# Patient Record
Sex: FEMALE | Race: WHITE
[De-identification: names, ages, dates, MRNs, and addresses within clinical notes are randomized per-mention and may not be internally consistent; named-entity substitution may affect disease eponyms.]

---

## 2017-04-07 ENCOUNTER — HOSPITAL ENCOUNTER (OUTPATIENT)
Dept: HOSPITAL 79 - RAD | Age: 42
End: 2017-04-07
Attending: CLINIC/CENTER
Payer: COMMERCIAL

## 2017-04-07 DIAGNOSIS — S90.122S: Primary | ICD-10-CM

## 2022-10-12 ENCOUNTER — HOSPITAL ENCOUNTER (OUTPATIENT)
Facility: HOSPITAL | Age: 47
Setting detail: SURGERY ADMIT
End: 2022-10-12
Attending: PLASTIC SURGERY | Admitting: PLASTIC SURGERY

## 2022-11-10 ENCOUNTER — PRE-ADMISSION TESTING (OUTPATIENT)
Dept: PREADMISSION TESTING | Facility: HOSPITAL | Age: 47
End: 2022-11-10

## 2022-11-10 ENCOUNTER — APPOINTMENT (OUTPATIENT)
Dept: PREADMISSION TESTING | Facility: HOSPITAL | Age: 47
End: 2022-11-10

## 2022-11-10 VITALS — WEIGHT: 174.6 LBS | BODY MASS INDEX: 35.2 KG/M2 | HEIGHT: 59 IN

## 2022-11-10 LAB
ANION GAP SERPL CALCULATED.3IONS-SCNC: 10 MMOL/L (ref 5–15)
APTT PPP: 30.8 SECONDS (ref 22–39)
BUN SERPL-MCNC: 29 MG/DL (ref 6–20)
BUN/CREAT SERPL: 24.8 (ref 7–25)
CALCIUM SPEC-SCNC: 9.7 MG/DL (ref 8.6–10.5)
CHLORIDE SERPL-SCNC: 103 MMOL/L (ref 98–107)
CO2 SERPL-SCNC: 28 MMOL/L (ref 22–29)
CREAT SERPL-MCNC: 1.17 MG/DL (ref 0.57–1)
DEPRECATED RDW RBC AUTO: 42.9 FL (ref 37–54)
EGFRCR SERPLBLD CKD-EPI 2021: 58 ML/MIN/1.73
ERYTHROCYTE [DISTWIDTH] IN BLOOD BY AUTOMATED COUNT: 11.6 % (ref 12.3–15.4)
GLUCOSE SERPL-MCNC: 139 MG/DL (ref 65–99)
HBA1C MFR BLD: 7.6 % (ref 4.8–5.6)
HCT VFR BLD AUTO: 39.4 % (ref 34–46.6)
HGB BLD-MCNC: 13.3 G/DL (ref 12–15.9)
INR PPP: 0.97 (ref 0.84–1.13)
MCH RBC QN AUTO: 33.8 PG (ref 26.6–33)
MCHC RBC AUTO-ENTMCNC: 33.8 G/DL (ref 31.5–35.7)
MCV RBC AUTO: 100.3 FL (ref 79–97)
PLATELET # BLD AUTO: 262 10*3/MM3 (ref 140–450)
PMV BLD AUTO: 10.7 FL (ref 6–12)
POTASSIUM SERPL-SCNC: 4 MMOL/L (ref 3.5–5.2)
PROTHROMBIN TIME: 12.7 SECONDS (ref 11.4–14.4)
QT INTERVAL: 404 MS
QTC INTERVAL: 410 MS
RBC # BLD AUTO: 3.93 10*6/MM3 (ref 3.77–5.28)
SODIUM SERPL-SCNC: 141 MMOL/L (ref 136–145)
WBC NRBC COR # BLD: 7.61 10*3/MM3 (ref 3.4–10.8)

## 2022-11-10 PROCEDURE — 85730 THROMBOPLASTIN TIME PARTIAL: CPT

## 2022-11-10 PROCEDURE — 85027 COMPLETE CBC AUTOMATED: CPT

## 2022-11-10 PROCEDURE — 93010 ELECTROCARDIOGRAM REPORT: CPT | Performed by: INTERNAL MEDICINE

## 2022-11-10 PROCEDURE — 83036 HEMOGLOBIN GLYCOSYLATED A1C: CPT

## 2022-11-10 PROCEDURE — 80048 BASIC METABOLIC PNL TOTAL CA: CPT

## 2022-11-10 PROCEDURE — 36415 COLL VENOUS BLD VENIPUNCTURE: CPT

## 2022-11-10 PROCEDURE — 85610 PROTHROMBIN TIME: CPT

## 2022-11-10 PROCEDURE — 93005 ELECTROCARDIOGRAM TRACING: CPT

## 2022-11-10 RX ORDER — TOPIRAMATE 100 MG/1
100 TABLET, FILM COATED ORAL 2 TIMES DAILY
COMMUNITY
Start: 2022-10-11

## 2022-11-10 RX ORDER — NYSTATIN 100000 U/G
1 CREAM TOPICAL AS NEEDED
COMMUNITY
Start: 2022-09-27

## 2022-11-10 RX ORDER — ENOXAPARIN SODIUM 100 MG/ML
INJECTION SUBCUTANEOUS
COMMUNITY
Start: 2022-11-03

## 2022-11-10 RX ORDER — LATANOPROST 50 UG/ML
SOLUTION/ DROPS OPHTHALMIC
COMMUNITY
Start: 2022-09-07

## 2022-11-10 RX ORDER — PROCHLORPERAZINE 25 MG/1
SUPPOSITORY RECTAL
COMMUNITY
Start: 2022-09-12

## 2022-11-10 RX ORDER — INSULIN ASPART 100 [IU]/ML
INJECTION, SOLUTION INTRAVENOUS; SUBCUTANEOUS
COMMUNITY
Start: 2022-08-23

## 2022-11-10 RX ORDER — SUMATRIPTAN 100 MG/1
TABLET, FILM COATED ORAL
COMMUNITY

## 2022-11-10 RX ORDER — GALCANEZUMAB 120 MG/ML
120 INJECTION, SOLUTION SUBCUTANEOUS
COMMUNITY
Start: 2022-10-12

## 2022-11-10 RX ORDER — HYDROCHLOROTHIAZIDE 25 MG/1
25 TABLET ORAL DAILY
COMMUNITY
Start: 2022-10-11

## 2022-11-10 RX ORDER — BACLOFEN 10 MG/1
10 TABLET ORAL DAILY
COMMUNITY
Start: 2022-10-11

## 2022-11-10 RX ORDER — LEVOTHYROXINE SODIUM 125 MCG
125 TABLET ORAL DAILY
COMMUNITY
Start: 2022-10-11

## 2022-11-10 RX ORDER — PROCHLORPERAZINE 25 MG/1
SUPPOSITORY RECTAL
COMMUNITY
Start: 2022-11-07

## 2022-11-10 RX ORDER — METFORMIN HYDROCHLORIDE 500 MG/1
1000 TABLET, EXTENDED RELEASE ORAL DAILY
COMMUNITY
Start: 2022-10-11

## 2022-11-10 RX ORDER — SERTRALINE HYDROCHLORIDE 100 MG/1
100 TABLET, FILM COATED ORAL DAILY
COMMUNITY
Start: 2022-11-03

## 2022-11-10 RX ORDER — LEVOCETIRIZINE DIHYDROCHLORIDE 5 MG/1
5 TABLET, FILM COATED ORAL NIGHTLY
COMMUNITY
Start: 2022-11-05

## 2022-11-10 RX ORDER — HYDROCODONE BITARTRATE AND ACETAMINOPHEN 7.5; 325 MG/1; MG/1
1-2 TABLET ORAL
COMMUNITY
Start: 2022-11-03

## 2022-11-10 RX ORDER — ONDANSETRON 4 MG/1
4 TABLET, FILM COATED ORAL EVERY 8 HOURS PRN
COMMUNITY
Start: 2022-11-03

## 2022-11-10 RX ORDER — OMEPRAZOLE 40 MG/1
40 CAPSULE, DELAYED RELEASE ORAL DAILY
COMMUNITY
Start: 2022-10-11

## 2022-11-10 RX ORDER — ERGOCALCIFEROL 1.25 MG/1
50000 CAPSULE ORAL
COMMUNITY
Start: 2022-10-14

## 2022-11-10 RX ORDER — DOXYCYCLINE HYCLATE 100 MG/1
TABLET, DELAYED RELEASE ORAL
COMMUNITY
End: 2022-11-10

## 2022-11-10 RX ORDER — DOCUSATE SODIUM 100 MG/1
CAPSULE, LIQUID FILLED ORAL
COMMUNITY
Start: 2022-11-03

## 2022-11-10 RX ORDER — EZETIMIBE 10 MG/1
10 TABLET ORAL DAILY
COMMUNITY
Start: 2022-10-11

## 2022-11-10 RX ORDER — MONTELUKAST SODIUM 10 MG/1
10 TABLET ORAL DAILY
COMMUNITY
Start: 2022-09-12

## 2022-11-10 RX ORDER — INSULIN PMP CART,AUT,G6/7,CNTR
EACH SUBCUTANEOUS
COMMUNITY
Start: 2022-10-28

## 2022-11-10 RX ORDER — LOSARTAN POTASSIUM 100 MG/1
100 TABLET ORAL DAILY
COMMUNITY

## 2022-11-10 RX ORDER — DIAZEPAM 5 MG/1
TABLET ORAL
COMMUNITY
Start: 2022-11-03

## 2022-11-10 RX ORDER — MELOXICAM 15 MG/1
15 TABLET ORAL DAILY
COMMUNITY
Start: 2022-09-14

## 2022-11-10 RX ORDER — CLINDAMYCIN HYDROCHLORIDE 300 MG/1
CAPSULE ORAL
COMMUNITY
Start: 2022-11-03

## 2022-11-10 RX ORDER — INSULIN PMP CART,AUT,G6/7,CNTR
EACH SUBCUTANEOUS
COMMUNITY
Start: 2022-09-17

## 2022-11-10 NOTE — PAT
An arrival time for procedure was not provided during PAT visit. If patient had any questions or concerns about their arrival time, they were instructed to contact their surgeon/physician.  Additionally, if the patient referred to an arrival time that was acquired from their my chart account, patient was encouraged to verify that time with their surgeon/physician. Arrival times are NOT provided in Pre Admission Testing Department.    Patient to apply Chlorhexadine wipes  to surgical area (as instructed) the night before procedure and the AM of procedure. Wipes provided.    Patient denies any current skin issues.     Patient viewed general PAT education video as instructed in their preoperative information received from their surgeon.  Patient stated the general PAT education video was viewed in its entirety and survey completed.  Copies of PAT general education handouts (Incentive Spirometry, Meds to Beds Program, Patient Belongings, Pre-op skin preparation instructions, Blood Glucose testing, Visitor policy, Surgery FAQ, Code H) distributed to patient if not printed. Education related to the PAT pass and skin preparation for surgery (if applicable) completed in PAT as a reinforcement to PAT education video. Patient instructed to return PAT pass provided today as well as completed skin preparation sheet (if applicable) on the day of procedure.     Additionally if patient had not viewed video yet but intended to view it at home or in our waiting area, then referred them to the handout with QR code/link provided during PAT visit.  Instructed patient to complete survey after viewing the video in its entirety.  Encouraged patient/family to read PAT general education handouts thoroughly and notify PAT staff with any questions or concerns. Patient verbalized understanding of all information and priority content.

## 2023-09-22 ENCOUNTER — TRANSCRIBE ORDERS (OUTPATIENT)
Dept: ADMINISTRATIVE | Facility: HOSPITAL | Age: 48
End: 2023-09-22
Payer: COMMERCIAL

## 2023-09-22 DIAGNOSIS — N28.9 RENAL INSUFFICIENCY: Primary | ICD-10-CM

## 2024-07-10 ENCOUNTER — TELEPHONE (OUTPATIENT)
Dept: PREADMISSION TESTING | Facility: HOSPITAL | Age: 49
End: 2024-07-10

## 2024-07-11 ENCOUNTER — PRE-ADMISSION TESTING (OUTPATIENT)
Dept: PREADMISSION TESTING | Facility: HOSPITAL | Age: 49
End: 2024-07-11
Payer: COMMERCIAL

## 2024-07-11 VITALS — WEIGHT: 179.2 LBS | BODY MASS INDEX: 37.61 KG/M2 | HEIGHT: 58 IN

## 2024-07-11 LAB
ANION GAP SERPL CALCULATED.3IONS-SCNC: 11.2 MMOL/L (ref 5–15)
BUN SERPL-MCNC: 27 MG/DL (ref 6–20)
BUN/CREAT SERPL: 18.4 (ref 7–25)
CALCIUM SPEC-SCNC: 8.8 MG/DL (ref 8.6–10.5)
CHLORIDE SERPL-SCNC: 105 MMOL/L (ref 98–107)
CO2 SERPL-SCNC: 25.8 MMOL/L (ref 22–29)
CREAT SERPL-MCNC: 1.47 MG/DL (ref 0.57–1)
DEPRECATED RDW RBC AUTO: 42.2 FL (ref 37–54)
EGFRCR SERPLBLD CKD-EPI 2021: 43.6 ML/MIN/1.73
ERYTHROCYTE [DISTWIDTH] IN BLOOD BY AUTOMATED COUNT: 12.1 % (ref 12.3–15.4)
GLUCOSE SERPL-MCNC: 140 MG/DL (ref 65–99)
HCT VFR BLD AUTO: 32.4 % (ref 34–46.6)
HGB BLD-MCNC: 11.2 G/DL (ref 12–15.9)
MCH RBC QN AUTO: 33.2 PG (ref 26.6–33)
MCHC RBC AUTO-ENTMCNC: 34.6 G/DL (ref 31.5–35.7)
MCV RBC AUTO: 96.1 FL (ref 79–97)
PLATELET # BLD AUTO: 238 10*3/MM3 (ref 140–450)
PMV BLD AUTO: 10.8 FL (ref 6–12)
POTASSIUM SERPL-SCNC: 3.5 MMOL/L (ref 3.5–5.2)
RBC # BLD AUTO: 3.37 10*6/MM3 (ref 3.77–5.28)
SODIUM SERPL-SCNC: 142 MMOL/L (ref 136–145)
WBC NRBC COR # BLD AUTO: 5.67 10*3/MM3 (ref 3.4–10.8)

## 2024-07-11 PROCEDURE — 36415 COLL VENOUS BLD VENIPUNCTURE: CPT

## 2024-07-11 PROCEDURE — 80048 BASIC METABOLIC PNL TOTAL CA: CPT

## 2024-07-11 PROCEDURE — 93005 ELECTROCARDIOGRAM TRACING: CPT

## 2024-07-11 PROCEDURE — 85027 COMPLETE CBC AUTOMATED: CPT

## 2024-07-11 RX ORDER — GABAPENTIN 100 MG/1
100 CAPSULE ORAL 2 TIMES DAILY
COMMUNITY

## 2024-07-11 RX ORDER — ESTRADIOL 1 MG/1
1 TABLET ORAL DAILY
COMMUNITY

## 2024-07-11 RX ORDER — MINOXIDIL 2.5 MG/1
2.5 TABLET ORAL DAILY
COMMUNITY

## 2024-07-11 RX ORDER — METOCLOPRAMIDE 10 MG/1
10 TABLET ORAL 4 TIMES DAILY PRN
COMMUNITY

## 2024-07-11 RX ORDER — TRAZODONE HYDROCHLORIDE 50 MG/1
50 TABLET ORAL NIGHTLY
COMMUNITY

## 2024-07-11 NOTE — DISCHARGE INSTRUCTIONS
Pre-Admission testing appointment completed today for patient's upcoming procedure here at [facility].    PAT PASS reviewed with patient and they verbalize understanding of the following:     Do not eat or drink anything after midnight the night before procedure unless otherwise instructed by physician/surgeon's office, this includes no gum, candy, mints, tobacco products or e-cigarettes.  Do not shave the area to be operated on at least 48 hours prior to procedure.  Do not wear makeup, lotion, hair products, or nail polish.  Do not wear any jewelry and remove all piercings.  Do not wear any adhesive if you wear dentures.  Do not wear contacts; bring in glasses if needed.  Only take medications on the morning of procedure as instructed by PAT nurse per anesthesia guidelines or as instructed by physician's office.  If you are on any blood thinners reach out to the physician/surgeon's office for instructions on when/if they will need to be stopped prior to procedure.  Bring in picture ID and insurance card, advanced directive copies if applicable, CPAP/BIPAP/Inhalers if indicated morning of procedure, leave any other valuables at home.  Ensure you have arranged for someone to drive you home the day of your procedure and someone to care for you at home afterwards. It is recommended that you do not drive, drink alcohol, or make any major legal decisions for at least 24 hours after your procedure is complete.  Chlorhexadine sponge along with instruction/information sheet given to patient. Readybath wipes given in lieu of CHG if patient has CHG allergy. Instructed patient to date, time, and initial the verification sheet once skin prep has been completed, and to return to Same Day [facility] the day of the procedure.     Introduction to anesthesia video watched by patient during appointment and anesthesia FAQ tip sheet given to patient.  Instructions and information given to patient about parking, hospital entrance, and  registration location.Chlorhexidine sponge along with instruction/verification sheet given to pt.  Instructed pt to date, time, and initial the verification sheet once skin prep has been  completed, and to return to Same Day Surgery the day of the procedure.  Pt. Verbalizes understanding.

## 2024-07-12 LAB
QT INTERVAL: 406 MS
QTC INTERVAL: 431 MS

## 2024-12-18 ENCOUNTER — OFFICE VISIT (OUTPATIENT)
Dept: CARDIOLOGY | Facility: CLINIC | Age: 49
End: 2024-12-18
Payer: COMMERCIAL

## 2024-12-18 ENCOUNTER — PATIENT ROUNDING (BHMG ONLY) (OUTPATIENT)
Dept: CARDIOLOGY | Facility: CLINIC | Age: 49
End: 2024-12-18
Payer: COMMERCIAL

## 2024-12-18 VITALS
WEIGHT: 171.4 LBS | HEART RATE: 58 BPM | SYSTOLIC BLOOD PRESSURE: 122 MMHG | BODY MASS INDEX: 34.56 KG/M2 | OXYGEN SATURATION: 94 % | DIASTOLIC BLOOD PRESSURE: 82 MMHG | HEIGHT: 59 IN

## 2024-12-18 DIAGNOSIS — E10.22 TYPE 1 DIABETES MELLITUS WITH STAGE 3 CHRONIC KIDNEY DISEASE, UNSPECIFIED WHETHER STAGE 3A OR 3B CKD: ICD-10-CM

## 2024-12-18 DIAGNOSIS — R94.31 ABNORMAL ECG: ICD-10-CM

## 2024-12-18 DIAGNOSIS — I34.1 MITRAL VALVE PROLAPSE: Primary | ICD-10-CM

## 2024-12-18 DIAGNOSIS — N18.30 TYPE 1 DIABETES MELLITUS WITH STAGE 3 CHRONIC KIDNEY DISEASE, UNSPECIFIED WHETHER STAGE 3A OR 3B CKD: ICD-10-CM

## 2024-12-18 DIAGNOSIS — I10 PRIMARY HYPERTENSION: ICD-10-CM

## 2024-12-18 NOTE — PROGRESS NOTES
[facility] CARDIOLOGY    New Patient Office Visit    Patient Name: Virginie Bean  : 1975   MRN: [id number]   Care Team: Patient Care Team:  Sherice Kenney APRN as PCP - General (Family Medicine)  Grayson Rodríguez MD as Consulting Physician (Geriatric Medicine)  Ruddy Bella MD as Cardiologist (Cardiology)    Chief Complaint   Patient presents with    Abnormal ECG     HPI: Virginie Bean is a 49 y.o. female with a history of DM I since ~, Hashimotos, CKD, HTN,  who presents today for an abnormal ECG done prior to knee arthroscopy.  She denies following with a cardiologist recently and does not have exertional chest pain or dyspnea.  She does report lower extremity swelling right greater than left which had been present prior to her knee procedure as well.  Distantly, she had been told that she had mitral valve prolapse based on echocardiogram.  This occurred while she was pregnant with her oldest child but never had any follow-up studies.  She has never had any ischemic evaluation.    Subjective   Review of Systems   Constitutional:  Negative for activity change.   Respiratory:  Negative for chest tightness and shortness of breath.    Cardiovascular:  Positive for leg swelling. Negative for chest pain and palpitations.       Past Medical History:   Diagnosis Date    Arthritis     CKD stage 3a, GFR 45-59 ml/min     Depression     Diabetes mellitus     TYPE 1    Disease of thyroid gland     GERD (gastroesophageal reflux disease)     Glaucoma     Hashimoto's disease     Hyperlipidemia     Hypertension     Migraines     Seasonal allergies     Tattoos     Vitamin D deficiency      Past Surgical History:   Procedure Laterality Date    BILATERAL BREAST REDUCTION      CHOLECYSTECTOMY      COLONOSCOPY      HIATAL HERNIA REPAIR      X2    HYSTERECTOMY      HYSTEROSCOPY ENDOMETRIAL ABLATION      KNEE ARTHROSCOPY Right     LAPAROSCOPIC TUBAL LIGATION       Social History      Socioeconomic History    Marital status:    Tobacco Use    Smoking status: Never    Smokeless tobacco: Never   Vaping Use    Vaping status: Never Used   Substance and Sexual Activity    Alcohol use: Not Currently    Drug use: Never    Sexual activity: Defer     History reviewed. No pertinent family history.      Current Outpatient Medications:     baclofen (LIORESAL) 10 MG tablet, Take 1 tablet by mouth Every Night., Disp: , Rfl:     Continuous Blood Gluc Sensor (Dexcom G6 Sensor), AS DIRECTED CHANGE SENSOR EVERY 10 DAYS 90 DAYS, Disp: , Rfl:     Continuous Blood Gluc Transmit (Dexcom G6 Transmitter) misc, , Disp: , Rfl:     Emgality 120 MG/ML auto-injector pen, Inject 1 mL under the skin into the appropriate area as directed Every 30 (Thirty) Days., Disp: , Rfl:     estradiol (ESTRACE) 1 MG tablet, Take 1 tablet by mouth Daily., Disp: , Rfl:     ezetimibe (ZETIA) 10 MG tablet, Take 1 tablet by mouth Daily., Disp: , Rfl:     Insulin Disposable Pump (Omnipod 5 G6 Intro, Gen 5,) kit, , Disp: , Rfl:     Insulin Disposable Pump (Omnipod 5 G6 Pod, Gen 5,) misc, CHANGE EVERY 3 DAYS, Disp: , Rfl:     latanoprost (XALATAN) 0.005 % ophthalmic solution, PLACE 1 DROP INTO BOTH EYES AT BEDTIME, Disp: , Rfl:     levocetirizine (XYZAL) 5 MG tablet, Take 1 tablet by mouth Every Night., Disp: , Rfl:     losartan (COZAAR) 100 MG tablet, Take 1 tablet by mouth Daily., Disp: , Rfl:     metoprolol tartrate (LOPRESSOR) 25 MG tablet, Take 1 tablet by mouth 2 (Two) Times a Day., Disp: , Rfl:     minoxidil (LONITEN) 2.5 MG tablet, Take 1 tablet by mouth Daily., Disp: , Rfl:     montelukast (SINGULAIR) 10 MG tablet, Take 1 tablet by mouth Daily., Disp: , Rfl:     NON FORMULARY, 1 (One) Time Per Week. ALLERGY INJECTIONS, Disp: , Rfl:     NovoLOG 100 UNIT/ML injection, INJECT UP  UNITS DAILY, VIA INSULIN PUMP SUBCUTANEOUS 90 DAYS, Disp: , Rfl:     omeprazole (priLOSEC) 40 MG capsule, Take 1 capsule by mouth Daily., Disp: ,  "Rfl:     sertraline (ZOLOFT) 100 MG tablet, Take 1 tablet by mouth Daily., Disp: , Rfl:     SUMAtriptan (IMITREX) 100 MG tablet, sumatriptan 100 mg tablet  TAKE 1 TABLET FOR MIGRAINE RELIEF. MAY REPEAT 2 HOURS LATER. MAXIMUM 200MG/DAY., Disp: , Rfl:     Synthroid 125 MCG tablet, Take 1 tablet by mouth Daily., Disp: , Rfl:     topiramate (TOPAMAX) 100 MG tablet, Take 1 tablet by mouth 2 (Two) Times a Day., Disp: , Rfl:     traZODone (DESYREL) 50 MG tablet, Take 1 tablet by mouth Every Night., Disp: , Rfl:     vitamin D (ERGOCALCIFEROL) 1.25 MG (56177 UT) capsule capsule, Take 1 capsule by mouth Every 7 (Seven) Days. MONDAY, Disp: , Rfl:   Allergies   Allergen Reactions    Turmeric Anaphylaxis    Guaifenesin Hives    Levofloxacin Hives    Other Hives     MONOCRYL SUTURE    Oxycontin [Oxycodone] GI Intolerance    Penicillins Hives    Sulfa Antibiotics Hives       Objective     Vitals:    12/18/24 0900   BP: 122/82   BP Location: Left arm   Patient Position: Sitting   Cuff Size: Adult   Pulse: 58   SpO2: 94%   Weight: 77.7 kg (171 lb 6.4 oz)   Height: 149.9 cm (59\")     Body mass index is 34.62 kg/m².  Gen: well developed, sitting up on exam table, comfortable appearing  HEENT: MMM, sclera anicteric, conjunctiva normal, no carotid bruits  CV: regular rate, regular rhythm, no murmurs or rubs, normal S1, S2. 2+ radial and PT pulses  Pulm: RA, normal work of breathing, no wheezes, rales, rhonchi  Ext: normal bulk for age, normal tone, trace dependent edema  Neuro: alert, oriented, face symmetrical, moving all extremities well  Psych: normal mood, appropriate affect    Most recent PCP note, imaging tests, and labs reviewed.    Labs:  Lab Results   Component Value Date    WBC 5.67 07/11/2024    HGB 11.2 (L) 07/11/2024    HCT 32.4 (L) 07/11/2024    MCV 96.1 07/11/2024     07/11/2024     Lab Results   Component Value Date    GLUCOSE 140 (H) 07/11/2024    BUN 27 (H) 07/11/2024    CREATININE 1.47 (H) 07/11/2024    BCR " 18.4 07/11/2024    K 3.5 07/11/2024    CO2 25.8 07/11/2024    CALCIUM 8.8 07/11/2024     Lab Results   Component Value Date    HGBA1C 7.60 (H) 11/10/2022     11/6/24   - Cr 1.4, BUN 32,    - TSH 2.1   - Ferritin 12, B12 422, Folate 6.7    10/24/24   - Total cholesterol 206, HDL 68, triglycerides 129,           ECG 12 Lead    Date/Time: 12/18/2024 9:21 AM  Performed by: Ruddy Bella MD    Authorized by: Ruddy Bella MD  Comparison: compared with previous ECG from 7/11/2024  Comparison to previous ECG: Non-specific ST/T wave changes have improved  Rhythm: sinus bradycardia  Rate: bradycardic  BPM: 58  Conduction: conduction normal  ST Segments: ST segments normal  T Waves: T waves normal  QRS axis: normal    Clinical impression: normal ECG        Assessment & Plan       ICD-10-CM ICD-9-CM   1. Mitral valve prolapse  I34.1 424.0   2. Abnormal ECG  R94.31 794.31   3. Primary hypertension  I10 401.9   4. Type 1 diabetes mellitus with stage 3 chronic kidney disease, unspecified whether stage 3a or 3b CKD  E10.22 250.41    N18.30 585.3        Abnormal ECG   - Previous tracing reviewed, nonspecific changes essentially resolved today and ECG is currently benign.   -In the absence of symptoms we will defer any coronary evaluation at this time    Possible history of MVP  Leg swelling   -Check TTE    Hypertension   -Continue current regimen, well-controlled today      Return in about 6 months (around 6/18/2025).    CARMELO Bella MD  12/18/24    [facility] Cardiology  [facility] [address] [phone]

## 2024-12-18 NOTE — PROGRESS NOTES
December 18, 2024    Hello, may I speak with Virginie Bean?    My name is GERI MEREDITH      I am  with [facility] CARDIOLOGY  [address]  [address]  [facility] [address] [phone].    Before we get started may I verify your date of birth? 1975    I am calling to officially welcome you to our practice and ask about your recent visit. Is this a good time to talk? yes    Tell me about your visit with us. What things went well?  EVERYTHING WENT REALLY GREAT.        We're always looking for ways to make our patients' experiences even better. Do you have recommendations on ways we may improve?  no    Overall were you satisfied with your first visit to our practice? yes       I appreciate you taking the time to speak with me today. Is there anything else I can do for you? no      Thank you, and have a great day.

## 2024-12-26 ENCOUNTER — HOSPITAL ENCOUNTER (OUTPATIENT)
Dept: CARDIOLOGY | Facility: HOSPITAL | Age: 49
Discharge: HOME | End: 2024-12-26
Admitting: INTERNAL MEDICINE
Payer: COMMERCIAL

## 2024-12-26 DIAGNOSIS — I34.1 MITRAL VALVE PROLAPSE: ICD-10-CM

## 2024-12-26 LAB
AV MEAN PRESS GRAD SYS DOP V1V2: 7 MMHG
AV VMAX SYS DOP: 184 CM/SEC
BH CV ECHO MEAS - AO MAX PG: 13.5 MMHG
BH CV ECHO MEAS - AO ROOT DIAM: 2 CM
BH CV ECHO MEAS - AO V2 VTI: 43.2 CM
BH CV ECHO MEAS - EDV(CUBED): 54.9 ML
BH CV ECHO MEAS - EDV(MOD-SP4): 35.6 ML
BH CV ECHO MEAS - EF(MOD-SP4): 71.3 %
BH CV ECHO MEAS - ESV(CUBED): 19.7 ML
BH CV ECHO MEAS - ESV(MOD-SP4): 10.2 ML
BH CV ECHO MEAS - FS: 28.9 %
BH CV ECHO MEAS - IVS/LVPW: 0.93 CM
BH CV ECHO MEAS - IVSD: 1.1 CM
BH CV ECHO MEAS - LA DIMENSION: 2.8 CM
BH CV ECHO MEAS - LAT PEAK E' VEL: 7.7 CM/SEC
BH CV ECHO MEAS - LV DIASTOLIC VOL/BSA (35-75): 20.6 CM2
BH CV ECHO MEAS - LV MASS(C)D: 183.4 GRAMS
BH CV ECHO MEAS - LV SYSTOLIC VOL/BSA (12-30): 5.9 CM2
BH CV ECHO MEAS - LVIDD: 3.8 CM
BH CV ECHO MEAS - LVIDS: 2.7 CM
BH CV ECHO MEAS - LVOT AREA: 1.54 CM2
BH CV ECHO MEAS - LVOT DIAM: 1.4 CM
BH CV ECHO MEAS - LVPWD: 1.2 CM
BH CV ECHO MEAS - MED PEAK E' VEL: 8.1 CM/SEC
BH CV ECHO MEAS - MV A MAX VEL: 73.8 CM/SEC
BH CV ECHO MEAS - MV E MAX VEL: 102 CM/SEC
BH CV ECHO MEAS - MV E/A: 1.38
BH CV ECHO MEAS - PA ACC TIME: 0.09 SEC
BH CV ECHO MEAS - RAP SYSTOLE: 10 MMHG
BH CV ECHO MEAS - RVSP: 36.3 MMHG
BH CV ECHO MEAS - SV(MOD-SP4): 25.4 ML
BH CV ECHO MEAS - SVI(MOD-SP4): 14.7 ML/M2
BH CV ECHO MEAS - TAPSE (>1.6): 2.09 CM
BH CV ECHO MEAS - TR MAX PG: 26.3 MMHG
BH CV ECHO MEAS - TR MAX VEL: 256.3 CM/SEC
BH CV ECHO MEASUREMENTS AVERAGE E/E' RATIO: 12.91
LEFT ATRIUM VOLUME INDEX: 19.1 ML/M2

## 2024-12-26 PROCEDURE — 93306 TTE W/DOPPLER COMPLETE: CPT

## 2025-04-14 ENCOUNTER — TELEPHONE (OUTPATIENT)
Dept: CARDIOLOGY | Facility: CLINIC | Age: 50
End: 2025-04-14
Payer: COMMERCIAL

## 2025-04-14 NOTE — TELEPHONE ENCOUNTER
Patient is requesting sooner appointment with Dr. Bella to manage BP. Patient states that she has been monitoring her BP since 3/28 when advised by her nephrologist. She reports she saw her PCP on 4/9, who adjusted her BP medications. BP has been 150s-160s/ 100s-110s. Advised patient to contact nephrology about elevated BP and medications. Patient verbalizes understanding.

## 2025-06-10 NOTE — PROGRESS NOTES
[facility] CARDIOLOGY    Established Patient Office Visit    Patient Name: Virginie Bean  : 1975   MRN: [id number]   Care Team: Patient Care Team:  Sherice Kenney APRN as PCP - General (Family Medicine)  Grayson Rodríguez MD as Consulting Physician (Geriatric Medicine)  Ruddy Bella MD as Cardiologist (Cardiology)    Chief Complaint   Patient presents with    Mitral Valve Prolapse     HPI: Virginie Bean is a 50 y.o. female with a history of DM I since ~, Hashimotos, CKD, HTN,  who presents today for routine follow-up. She had initially been referred due to an abnormal ECG done prior to knee arthroscopy but ECG at her Dec 2024 visit was normal.     Since her initial visit she reports no new cardiac issues.  She underwent her knee arthroscopy with no issues with improvement in her knee pain.  She has had intermittent leg swelling but it is currently doing well right now.  Her blood pressure has been better controlled than prior.    Subjective   Subjective   Review of Systems   Constitutional:  Negative for activity change.   Respiratory:  Negative for chest tightness and shortness of breath.    Cardiovascular:  Negative for chest pain, palpitations and leg swelling.       Current Outpatient Medications:     amLODIPine (NORVASC) 2.5 MG tablet, Take 1 tablet by mouth Daily., Disp: , Rfl:     amLODIPine (NORVASC) 5 MG tablet, Take 1 tablet by mouth Daily., Disp: , Rfl:     baclofen (LIORESAL) 10 MG tablet, Take 1 tablet by mouth Every Night., Disp: , Rfl:     cefdinir (OMNICEF) 300 MG capsule, Take 1 capsule by mouth Daily., Disp: , Rfl:     Continuous Blood Gluc Sensor (Dexcom G6 Sensor), AS DIRECTED CHANGE SENSOR EVERY 10 DAYS 90 DAYS, Disp: , Rfl:     Continuous Blood Gluc Transmit (Dexcom G6 Transmitter) misc, , Disp: , Rfl:     Emgality 120 MG/ML auto-injector pen, Inject 1 mL under the skin into the appropriate area as directed Every 30 (Thirty) Days., Disp:  , Rfl:     ezetimibe (ZETIA) 10 MG tablet, Take 1 tablet by mouth Daily., Disp: , Rfl:     Insulin Disposable Pump (Omnipod 5 G6 Intro, Gen 5,) kit, , Disp: , Rfl:     Insulin Disposable Pump (Omnipod 5 G6 Pod, Gen 5,) misc, CHANGE EVERY 3 DAYS, Disp: , Rfl:     latanoprost (XALATAN) 0.005 % ophthalmic solution, PLACE 1 DROP INTO BOTH EYES AT BEDTIME, Disp: , Rfl:     levocetirizine (XYZAL) 5 MG tablet, Take 1 tablet by mouth Every Night., Disp: , Rfl:     losartan (COZAAR) 100 MG tablet, Take 1 tablet by mouth Daily., Disp: , Rfl:     metoprolol tartrate (LOPRESSOR) 25 MG tablet, Take 1 tablet by mouth 2 (Two) Times a Day., Disp: , Rfl:     minoxidil (LONITEN) 2.5 MG tablet, Take 1 tablet by mouth Daily., Disp: , Rfl:     montelukast (SINGULAIR) 10 MG tablet, Take 1 tablet by mouth Daily., Disp: , Rfl:     NON FORMULARY, 1 (One) Time Per Week. ALLERGY INJECTIONS, Disp: , Rfl:     NovoLOG 100 UNIT/ML injection, INJECT UP  UNITS DAILY, VIA INSULIN PUMP SUBCUTANEOUS 90 DAYS, Disp: , Rfl:     omeprazole (priLOSEC) 40 MG capsule, Take 1 capsule by mouth Daily., Disp: , Rfl:     sertraline (ZOLOFT) 100 MG tablet, Take 1 tablet by mouth Daily., Disp: , Rfl:     SUMAtriptan (IMITREX) 100 MG tablet, sumatriptan 100 mg tablet  TAKE 1 TABLET FOR MIGRAINE RELIEF. MAY REPEAT 2 HOURS LATER. MAXIMUM 200MG/DAY., Disp: , Rfl:     Synthroid 125 MCG tablet, Take 1 tablet by mouth Daily., Disp: , Rfl:     Synthroid 137 MCG tablet, Take 1 tablet by mouth See Admin Instructions. ON WEEKENDS, Disp: , Rfl:     topiramate (TOPAMAX) 100 MG tablet, Take 1 tablet by mouth 2 (Two) Times a Day., Disp: , Rfl:     vitamin D (ERGOCALCIFEROL) 1.25 MG (20815 UT) capsule capsule, Take 1 capsule by mouth Every 7 (Seven) Days. MONDAY, Disp: , Rfl:     estradiol (ESTRACE) 1 MG tablet, Take 1 tablet by mouth Daily., Disp: , Rfl:     Allergies   Allergen Reactions    Turmeric Anaphylaxis    Guaifenesin Hives    Levofloxacin Hives    Other Hives      "MONOCRYL SUTURE    Oxycontin [Oxycodone] GI Intolerance    Penicillins Hives    Sulfa Antibiotics Hives     Objective   Objective     Vitals:    06/11/25 1435   BP: 102/80   BP Location: Right arm   Patient Position: Sitting   Cuff Size: Adult   Pulse: 74   SpO2: 98%   Weight: 81.2 kg (179 lb)   Height: 149.9 cm (59\")   Body mass index is 36.15 kg/m².  Gen: well developed, sitting up on exam table, comfortable appearing  HEENT: MMM, sclera anicteric, conjunctiva normal, no carotid bruits  CV: regular rate, regular rhythm, no murmurs or rubs, normal S1, S2. 2+ radial and PT pulses  Pulm: RA, normal work of breathing, no wheezes, rales, rhonchi  Ext: normal bulk for age, normal tone, no dependent edema  Neuro: alert, oriented, face symmetrical, moving all extremities well  Psych: normal mood, appropriate affect    Most recent PCP note, imaging tests, and labs reviewed.    Labs:  Lab Results   Component Value Date    WBC 5.67 07/11/2024    HGB 11.2 (L) 07/11/2024    HCT 32.4 (L) 07/11/2024    MCV 96.1 07/11/2024     07/11/2024     Lab Results   Component Value Date    GLUCOSE 140 (H) 07/11/2024    BUN 27 (H) 07/11/2024    CREATININE 1.47 (H) 07/11/2024    BCR 18.4 07/11/2024    K 3.5 07/11/2024    CO2 25.8 07/11/2024    CALCIUM 8.8 07/11/2024     Lab Results   Component Value Date    HGBA1C 7.60 (H) 11/10/2022     11/6/24   - Cr 1.4, BUN 32,    - TSH 2.1   - Ferritin 12, B12 422, Folate 6.7    10/24/24   - Total cholesterol 206, HDL 68, triglycerides 129,     Results for orders placed during the hospital encounter of 12/26/24    Adult Transthoracic Echo Complete w/ Color, Spectral and Contrast if necessary per protocol    Interpretation Summary    Normal left ventricular cavity size, wall thickness and wall motion noted.    Left ventricular systolic function is normal. Left ventricular ejection fraction appears to be 61 - 65%.    Left ventricular diastolic function was normal.    Aortic valve is " "trileaflet valve, no aortic stenosis or regurgitation detected    Mitral valve echo is normal no mitral stenosis or regurgitation noted.    Mild tricuspid regurgitation with mild pulmonary hypertension noted peak RV systolic pressure is around 39 mmHg.    No pulmonic stenosis or regurgitation noted.    No pericardial effusion detected    Procedures    Assessment & Plan   Assessment & Plan       ICD-10-CM ICD-9-CM   1. Primary hypertension  I10 401.9   2. Abnormal ECG  R94.31 794.31   3. Type 1 diabetes mellitus with stage 3 chronic kidney disease, unspecified whether stage 3a or 3b CKD  E10.22 250.41    N18.30 585.3   4. Class 2 obesity without serious comorbidity with body mass index (BMI) of 36.0 to 36.9 in adult, unspecified obesity type  E66.812 278.00    Z68.36 V85.36        History of Abnormal ECG with non-specific ST changes   - Last ECG Dec 2024 was normal    - No new symptoms to prompt further testing at this time    Possible history of MVP   - TTE Dec 2024 with no evidence of MVP, no significant MR, mild TR    Mildly elevated RVSP   - Discussed possible referral for sleep testing however patient stated that she would not wear CPAP even if it was recommended.  Will defer referrals at this visit    Hypertension   -Continue current regimen, well-controlled today    HLD   - on ezetimibe   - history of SE, \"can't take statins\", with flu-like symptoms, ER visit with fever      Return in about 1 year (around 6/11/2026).    CARMELO Bella MD  06/11/25    [facility] Cardiology  [address] [place] [address] [phone]      "

## 2025-06-11 ENCOUNTER — OFFICE VISIT (OUTPATIENT)
Dept: CARDIOLOGY | Facility: CLINIC | Age: 50
End: 2025-06-11
Payer: COMMERCIAL

## 2025-06-11 VITALS
SYSTOLIC BLOOD PRESSURE: 102 MMHG | HEART RATE: 74 BPM | WEIGHT: 179 LBS | OXYGEN SATURATION: 98 % | BODY MASS INDEX: 36.08 KG/M2 | HEIGHT: 59 IN | DIASTOLIC BLOOD PRESSURE: 80 MMHG

## 2025-06-11 DIAGNOSIS — N18.30 TYPE 1 DIABETES MELLITUS WITH STAGE 3 CHRONIC KIDNEY DISEASE, UNSPECIFIED WHETHER STAGE 3A OR 3B CKD: ICD-10-CM

## 2025-06-11 DIAGNOSIS — E66.812 CLASS 2 OBESITY WITHOUT SERIOUS COMORBIDITY WITH BODY MASS INDEX (BMI) OF 36.0 TO 36.9 IN ADULT, UNSPECIFIED OBESITY TYPE: ICD-10-CM

## 2025-06-11 DIAGNOSIS — R94.31 ABNORMAL ECG: ICD-10-CM

## 2025-06-11 DIAGNOSIS — E10.22 TYPE 1 DIABETES MELLITUS WITH STAGE 3 CHRONIC KIDNEY DISEASE, UNSPECIFIED WHETHER STAGE 3A OR 3B CKD: ICD-10-CM

## 2025-06-11 DIAGNOSIS — I10 PRIMARY HYPERTENSION: Primary | ICD-10-CM

## 2025-06-11 RX ORDER — CEFDINIR 300 MG/1
300 CAPSULE ORAL DAILY
COMMUNITY
Start: 2025-06-06

## 2025-06-11 RX ORDER — AMLODIPINE BESYLATE 5 MG/1
5 TABLET ORAL DAILY
COMMUNITY

## 2025-06-11 RX ORDER — AMLODIPINE BESYLATE 2.5 MG/1
2.5 TABLET ORAL DAILY
COMMUNITY
Start: 2025-05-01

## 2025-06-11 RX ORDER — LEVOTHYROXINE SODIUM 137 MCG
137 TABLET ORAL SEE ADMIN INSTRUCTIONS
COMMUNITY
Start: 2025-05-21